# Patient Record
(demographics unavailable — no encounter records)

---

## 2024-10-29 NOTE — HISTORY OF PRESENT ILLNESS
[___ Days Post Op] : post op day #[unfilled] [de-identified] : Right total shoulder replacement 10/17/24. [de-identified] : Patient presents today for the first postop visit status post right total shoulder replacement.  Patient was discharged to a subacute rehab facility.  She is noted to have a very prolonged block after the anesthetic and surgical procedure.  Patient continues to complain of some weakness about the arm and decreased sensation noted.  She is currently using a sling.  She has minimal discomfort about the shoulder. [de-identified] : Exam of the right shoulder Veals incisions healing well.  There is no warmth erythema.  Is no ecchymosis.  There is no drainage.  Deltoid function appears intact.  She does have significant weakness of the bicep and also over the wrist and finger extensors.  She has diffuse decree sensation in the upper extremity. [de-identified] : AP, outlet, and glenoid views of the right shoulder  were obtained.  The patient is status post total shoulder replacement.  The components are well fixed with good alignment.  No evidence of loosening or periprosthetic fracture is noted. [de-identified] : This patient presents today for the first postop visit status post right total shoulder replacement.  Patient noted to have continued weakness and decreased sensation about the right upper extremity after the procedure.  She also had a prolonged block which lasted over 48 hours.  It was thought that this might be due to her MS which could contribute to prolonged block and issues post block.  Due to the patient's continued symptoms I recommended neurologic evaluation for her continued motor deficits.  She will see us back after the neurologic evaluation.  She was given a consult sheet for the neurologist.  She will continue doing therapy for range of motion exercises in the subacute rehab facility.

## 2024-10-29 NOTE — HISTORY OF PRESENT ILLNESS
[___ Days Post Op] : post op day #[unfilled] [de-identified] : Right total shoulder replacement 10/17/24. [de-identified] : Patient presents today for the first postop visit status post right total shoulder replacement.  Patient was discharged to a subacute rehab facility.  She is noted to have a very prolonged block after the anesthetic and surgical procedure.  Patient continues to complain of some weakness about the arm and decreased sensation noted.  She is currently using a sling.  She has minimal discomfort about the shoulder. [de-identified] : Exam of the right shoulder Veals incisions healing well.  There is no warmth erythema.  Is no ecchymosis.  There is no drainage.  Deltoid function appears intact.  She does have significant weakness of the bicep and also over the wrist and finger extensors.  She has diffuse decree sensation in the upper extremity. [de-identified] : AP, outlet, and glenoid views of the right shoulder  were obtained.  The patient is status post total shoulder replacement.  The components are well fixed with good alignment.  No evidence of loosening or periprosthetic fracture is noted. [de-identified] : This patient presents today for the first postop visit status post right total shoulder replacement.  Patient noted to have continued weakness and decreased sensation about the right upper extremity after the procedure.  She also had a prolonged block which lasted over 48 hours.  It was thought that this might be due to her MS which could contribute to prolonged block and issues post block.  Due to the patient's continued symptoms I recommended neurologic evaluation for her continued motor deficits.  She will see us back after the neurologic evaluation.  She was given a consult sheet for the neurologist.  She will continue doing therapy for range of motion exercises in the subacute rehab facility.

## 2024-12-02 NOTE — HISTORY OF PRESENT ILLNESS
[de-identified] : Status post total replacement of right shoulder DOS: 10/17/24 [de-identified] : This patient presents today for postop visit 6 weeks after total shoulder replacement.  Patient's been in inpatient rehab getting physical therapy.  Pain level 2 out of 10.  Takes Tylenol for the pain.  She has been utilizing the sling.  She gets physical therapy every day.  She is noting improvement in the strength of her arm which had significant weakness of the bicep and also of the wrist extensors on the first postoperative visit. [de-identified] : Exam of the right shoulder Veals incision is well-healed.  Is no warmth erythema.  Is no ecchymosis.  Is no soft tissue swelling.  There is no drainage.  She has passive forward flexion to 160 degrees.  Her bicep is now functioning and she has slight weakness of bicep.  She also has functioning wrist extensors with slight weakness.  Pulses are intact distally.  Capillary refill is normal. There is no edema or lymphadenopathy. [de-identified] : AP, outlet, and glenoid views of the right shoulder  were obtained.  The patient is status post total shoulder replacement.  The components are well fixed with good alignment.  No evidence of loosening or periprosthetic fracture is noted. [de-identified] : Patient presents today for follow-up status post right total shoulder placement performed approximate 6 weeks ago.  Patient doing much better with function of her bicep and wrist extensors coming back.  She is to continue inpatient physical therapy and they will discharge her when they feel it is appropriate.  I would like to see her back in 6 weeks for follow-up and reevaluation.  She was given a prescription and a note for the therapy facility regarding any restrictions and continue therapy instructions.

## 2025-03-27 NOTE — HISTORY OF PRESENT ILLNESS
[FreeTextEntry1] : 72 year old with history of multiple sclerosis,  HTN, HLD, DM, PPM s/p heart block, arthritis presents for a followup  Since her last visit, she is s/p  right shoulder replacement at [place] but her post op course was complicated by significant arm weakness. She is noted improved strength with PT.   She is doing well. She tries to stay active. She dances multiple times a week for exercise. She denies any dyspnea. She   denies any chest pain, PND, orthopnea, lower extremity edema,  syncope, stroke like symptoms.   She will sometimes feel lightheadedness when getting up quickly. She does not stay well hydrated. Medication reconciliation performed. She is compliant with her medications. She started on Moujaro

## 2025-03-27 NOTE — DISCUSSION/SUMMARY
[FreeTextEntry1] : 72 year woman with a history as listed presents for a followup cardiac evaluation.  Billie is relatively doing well. She denies any anginal symptoms. Clinically she is euvolemic on exam. Her EKG is unchanged from previous. She is VPaced. She does not need any further ischemic testing.  Her blood pressure is a bit low and she has mild orthostatic symptoms. She will continue her current doses of Toprol, Olmesartan. Stop the chlorthalidone.  She needs to hydrate more. She will get a 2d echo to assess for any  new structural heart disease, changes in valvular and ventricular function.  She will have their device interrogated in our PPM/ICD clinic on a regularly scheduled basis.  Exercise and diet counseling was performed in order to reduce her future cardiovascular risk. Fu in 1 month for BP check She will followup with me in 3 months or sooner if necessary.

## 2025-03-27 NOTE — CARDIOLOGY SUMMARY
[de-identified] :  [de-identified] : 1/28/21 pharm: normal SPECT [de-identified] : 1/2023 normal LV function.  [de-identified] : 12/2020 Ernie Shields [de-identified] : 7/2021 mild carotid stenosis.

## 2025-04-28 NOTE — PHYSICAL EXAM
[Normal] : the sclera and conjunctiva were normal [Hearing Threshold Finger Rub Not [place]] : hearing was normal [Normal Appearance] : the appearance of the neck was normal [No Respiratory Distress] : no respiratory distress [Heart Rate And Rhythm] : heart rate was normal and rhythm regular [Bowel Sounds] : normal bowel sounds [Abdomen Tenderness] : non-tender [Abdomen Soft] : soft [Oriented To Time, Place, And Person] : oriented to person, place, and time

## 2025-04-28 NOTE — PHYSICAL EXAM
[Normal] : the sclera and conjunctiva were normal [Hearing Threshold Finger Rub Not [place]] : hearing was normal [Normal Appearance] : the appearance of the neck was normal [No Respiratory Distress] : no respiratory distress [Heart Rate And Rhythm] : heart rate was normal and rhythm regular [Bowel Sounds] : normal bowel sounds [Abdomen Tenderness] : non-tender [Abdomen Soft] : soft [Oriented To Time, Place, And Person] : oriented to person, place, and time

## 2025-04-29 NOTE — HISTORY OF PRESENT ILLNESS
[de-identified] : 10/20/21, Dr. Carmona: Chronic active inflammation in the distal esophagus consistent with reflux disease of the distal esophagus (no H. Pylori).   [FreeTextEntry1] : 10/20/21, Dr. Carmona: Tubular adenoma (x2) in the transverse and left descending colon. 10/13/2016, Dr. Gabriel: diverticulosis, internal hemorrhoids.

## 2025-04-29 NOTE — HISTORY OF PRESENT ILLNESS
[de-identified] : 10/20/21, Dr. Carmona: Chronic active inflammation in the distal esophagus consistent with reflux disease of the distal esophagus (no H. Pylori).   [FreeTextEntry1] : 10/20/21, Dr. Carmona: Tubular adenoma (x2) in the transverse and left descending colon. 10/13/2016, Dr. Gabriel: diverticulosis, internal hemorrhoids.

## 2025-04-29 NOTE — CONSULT LETTER
[Dear  ___] : Dear  [unfilled], [Consult Letter:] : I had the pleasure of evaluating your patient, [unfilled]. [Please see my note below.] : Please see my note below. [Consult Closing:] : Thank you very much for allowing me to participate in the care of this patient.  If you have any questions, please do not hesitate to contact me. [Sincerely,] : Sincerely, [FreeTextEntry3] : Dr. Chaya López

## 2025-04-29 NOTE — ASSESSMENT
[FreeTextEntry1] : 73-year-old female with PMHx of GERD (on Aciphex 20mg/day), constipation, IBS, adenomatous colon polyps, diverticulosis, L BBB (s/p pacemaker placement), and MS presents for follow-up appointment. Today, pt reports continuing GERD but taking Aciphex daily. Reports that she is on Mounjaro, has diarrhea for 3 days after dose but resolves by the end of the week. Tried Metamucil in the past but caused fecal urgency. Exercises regularly through dance classes and outside activities.   Recommend pt try Citrucel or BeneFiber daily. Recommend pt take Senna PRN for constipation. Recommend pt drink 64 oz of water daily. Pt received bowel health handout. Follow-up in 1 year for screening colonoscopy or sooner PRN.   Patient seen and examined, overseeing documentation by Bridget Bennett. Will proceed with medication. Medications as usual. Reviewed and reconciled medications, allergies, PMHx, PSHx, SocHx, FMHx. Reviewed imaging, blood work, diagnostic testing, discussed with patient. Further recommendations pending results of above work-up and evaluation.  All questions answered Call with any questions or concerns Time spent before and after visit reviewing patient's chart

## 2025-06-10 NOTE — CARDIOLOGY SUMMARY
[de-identified] :  [de-identified] : 1/28/21 pharm: normal SPECT [de-identified] : 1/2023 normal LV function.  4/2025 normal LV function Ao 4.0 cm  [de-identified] : 12/2020 Ernie Shields [de-identified] : 7/2021 mild carotid stenosis.

## 2025-06-10 NOTE — HISTORY OF PRESENT ILLNESS
[FreeTextEntry1] : 72 year old with history of multiple sclerosis,  HTN, HLD, DM, PPM s/p heart block, arthritis presents for a followup  Since her last visit, she is doing well. She tries to stay active. She dances multiple times a week for exercise. She denies any dyspnea. She   denies any chest pain, PND, orthopnea, lower extremity edema,  syncope, stroke like symptoms. Her lightheadedness has resolved. Though her BP is mildly elevated and her feet mildly swollen which resolves with elevation.  . Medication reconciliation performed. She is compliant with her medications.

## 2025-06-10 NOTE — DISCUSSION/SUMMARY
[FreeTextEntry1] : 72 year woman with a history as listed presents for a followup cardiac evaluation.  Billie is relatively doing well. She denies any anginal symptoms. Clinically she is euvolemic on exam. Her EKG is unchanged from previous. She is VPaced. She does not need any further ischemic testing.  Her blood pressure has increased off of the Chlorthalidone. Though she is complaining of mild edema 2/2 likely to venous insufficiency.  She will continue her current doses of Toprol, Olmesartan. She will try HCTZ 12.5mg Qday. Repeat labs in 2-3 months.  She will have their device interrogated in our PPM/ICD clinic on a regularly scheduled basis.  Exercise and diet counseling was performed in order to reduce her future cardiovascular risk.   She will followup with me in 3 months or sooner if necessary.  [EKG obtained to assist in diagnosis and management of assessed problem(s)] : EKG obtained to assist in diagnosis and management of assessed problem(s)

## 2025-06-10 NOTE — PHYSICAL EXAM
[Well Developed] : well developed [Well Nourished] : well nourished [No Acute Distress] : no acute distress [Normal Conjunctiva] : normal conjunctiva [Normal Venous Pressure] : normal venous pressure [Normal S1, S2] : normal S1, S2 [No Carotid Bruit] : no carotid bruit [No Murmur] : no murmur [No Rub] : no rub [No Gallop] : no gallop [Clear Lung Fields] : clear lung fields [Good Air Entry] : good air entry [No Respiratory Distress] : no respiratory distress  [Soft] : abdomen soft [Non Tender] : non-tender [No Masses/organomegaly] : no masses/organomegaly [Normal Bowel Sounds] : normal bowel sounds [Normal Gait] : normal gait [No Edema] : no edema [No Cyanosis] : no cyanosis [No Clubbing] : no clubbing [No Varicosities] : no varicosities [No Skin Lesions] : no skin lesions [No Rash] : no rash [Moves all extremities] : moves all extremities [No Focal Deficits] : no focal deficits [Normal Speech] : normal speech [Alert and Oriented] : alert and oriented [Normal memory] : normal memory

## 2025-07-16 NOTE — HISTORY OF PRESENT ILLNESS
[8] : 8 [1] : 2 [Retired] : Work status: retired [] : no [FreeTextEntry1] : Right shoulder [de-identified] : Special

## 2025-07-16 NOTE — DATA REVIEWED
[FreeTextEntry1] : SHOULDER XR RIGHT (AP/Outlet) taken and reviewed on 7/16/25: The clinically significant findings include the hardware in appropriate position.  There are minor AC changes.  There are no acute lewis changes. Glenoid intact w/o lucency. Head 7-8 mm above greater tuberosity.  SCAPULA XR RIGHT (Axillary/[place]) taken and reviewed on 7/16/25: The clinically significant findings include a Type 1-II acromion with spur.

## 2025-07-16 NOTE — PHYSICAL EXAM
[Right] : right shoulder [Minimal] : minimal [5 ___] : forward flexion 5[unfilled]/5 [5___] : external rotation 5[unfilled]/5 [Left] : left shoulder [] : healed incision [Sitting] : sitting [Mild] : mild [FreeTextEntry3] : The bruising is secondary to massage at PT 7/15/25. There is no calor [de-identified] : +arc [TWNoteComboBox4] : passive forward flexion 120 degrees [TWNoteComboBox6] : internal rotation L4 [de-identified] : external rotation 60 degrees

## 2025-07-16 NOTE — REASON FOR VISIT
[FreeTextEntry2] : NEW PATIENT 07/16/2025 This a 73-year-old RHD F retired  who is status post R TSR with Dr. Hallman on 10/17/24 for arthritis.  There was weakness in the arm post operatively.  She sees a neurologist for MS.  She had a brain MRI post op, which was normal.  She recalls an EMG which was also OK.  She was admitted to rehab, then discharged to home.  She has been to outpatient PT.  The arm still feels weak.  No f/c/s.  The pain is OK.  She is here for another opinion.  She also had a L TSR with Dr. Hallman in 2022.

## 2025-07-16 NOTE — ASSESSMENT
[FreeTextEntry1] : _____ We discussed the underlying pathology. This is chronic with exacerbation. Our goal for treatment is to restore as close to pre-surgery function as possible. Though, the shoulder function will not return to normal levels. Treatment options reviewed. Will send for blood work, CRP, ESR, CBC with diff Meloxicam 15 mg QD x 10-14 days is prescribed with risks of GI symptoms reviewed. I do think she should give this more time to appreciate her full recovery income Follow up 3 weeks Cautions discussed. Questions answered.   Patient seen by Dr. Mick Kuo, who determined the assessment and plan. Charissa CAAL participated in the care of the patient, including the history and physical exam. .I, Monroe Salinas, am scribing for Dr. Mick Kuo in his presence for the chief complaint, physical exam, studies, assessment, and/or plan.